# Patient Record
Sex: FEMALE | Race: WHITE | Employment: FULL TIME | RURAL
[De-identification: names, ages, dates, MRNs, and addresses within clinical notes are randomized per-mention and may not be internally consistent; named-entity substitution may affect disease eponyms.]

---

## 2022-08-13 ENCOUNTER — APPOINTMENT (OUTPATIENT)
Dept: GENERAL RADIOLOGY | Age: 42
End: 2022-08-13
Attending: EMERGENCY MEDICINE
Payer: COMMERCIAL

## 2022-08-13 ENCOUNTER — APPOINTMENT (OUTPATIENT)
Dept: CT IMAGING | Age: 42
End: 2022-08-13
Attending: EMERGENCY MEDICINE
Payer: COMMERCIAL

## 2022-08-13 ENCOUNTER — HOSPITAL ENCOUNTER (EMERGENCY)
Age: 42
Discharge: HOME | End: 2022-08-13
Attending: EMERGENCY MEDICINE
Payer: COMMERCIAL

## 2022-08-13 VITALS
BODY MASS INDEX: 23.92 KG/M2 | HEIGHT: 63 IN | WEIGHT: 135 LBS | DIASTOLIC BLOOD PRESSURE: 59 MMHG | OXYGEN SATURATION: 99 % | RESPIRATION RATE: 18 BRPM | SYSTOLIC BLOOD PRESSURE: 104 MMHG | TEMPERATURE: 98 F | HEART RATE: 72 BPM

## 2022-08-13 DIAGNOSIS — S22.41XA CLOSED FRACTURE OF MULTIPLE RIBS OF RIGHT SIDE, INITIAL ENCOUNTER: Primary | ICD-10-CM

## 2022-08-13 PROCEDURE — 77030027138 HC INCENT SPIROMETER -A

## 2022-08-13 PROCEDURE — 74011000250 HC RX REV CODE- 250: Performed by: EMERGENCY MEDICINE

## 2022-08-13 PROCEDURE — 74011250637 HC RX REV CODE- 250/637: Performed by: EMERGENCY MEDICINE

## 2022-08-13 PROCEDURE — 71250 CT THORAX DX C-: CPT

## 2022-08-13 PROCEDURE — 71101 X-RAY EXAM UNILAT RIBS/CHEST: CPT

## 2022-08-13 PROCEDURE — 99284 EMERGENCY DEPT VISIT MOD MDM: CPT

## 2022-08-13 RX ORDER — IBUPROFEN 600 MG/1
600 TABLET ORAL
Qty: 20 TABLET | Refills: 0 | Status: SHIPPED | OUTPATIENT
Start: 2022-08-13

## 2022-08-13 RX ORDER — HYDROCODONE BITARTRATE AND ACETAMINOPHEN 5; 325 MG/1; MG/1
1 TABLET ORAL
Qty: 10 TABLET | Refills: 0 | Status: SHIPPED | OUTPATIENT
Start: 2022-08-13 | End: 2022-08-16

## 2022-08-13 RX ORDER — HYDROCODONE BITARTRATE AND ACETAMINOPHEN 5; 325 MG/1; MG/1
1 TABLET ORAL
Status: COMPLETED | OUTPATIENT
Start: 2022-08-13 | End: 2022-08-13

## 2022-08-13 RX ORDER — ACETAMINOPHEN 500 MG
1000 TABLET ORAL
Status: COMPLETED | OUTPATIENT
Start: 2022-08-13 | End: 2022-08-13

## 2022-08-13 RX ORDER — LIDOCAINE 4 G/100G
1 PATCH TOPICAL
Status: DISCONTINUED | OUTPATIENT
Start: 2022-08-13 | End: 2022-08-13 | Stop reason: HOSPADM

## 2022-08-13 RX ADMIN — ACETAMINOPHEN 1000 MG: 500 TABLET ORAL at 09:30

## 2022-08-13 RX ADMIN — HYDROCODONE BITARTRATE AND ACETAMINOPHEN 1 TABLET: 5; 325 TABLET ORAL at 11:05

## 2022-08-13 NOTE — DISCHARGE INSTRUCTIONS
You were seen in the emergency room for pain after a fall. You have broken the back of rib #8 and 9. Use the pain medication every 6 hours with a dose of extra strength Tylenol (500mg) and Motrin every 6 hours so that you can take nice deep breaths and do not develop any pneumonia; is best if you alternate these medications. If you develop any trouble breathing or if worsening pain, please return to the emergency room.

## 2022-08-13 NOTE — ED TRIAGE NOTES
Pt presents via EMS for right sided rib pain s/p a GLF last night. Pt states she slipped on something and it made her fall. Pt denies hitting her head.  Pt was given fentanyl 100 mcg and 4 mg of zofran by EMS PTA

## 2022-08-13 NOTE — ED PROVIDER NOTES
EMERGENCY DEPARTMENT HISTORY AND PHYSICAL EXAM          Date: 8/13/2022  Patient Name: Lexi Cano    History of Presenting Illness     Chief Complaint   Patient presents with    Rib Pain       History Provided By: Patient and EMS    HPI: Lexi Cano is a 36 y.o. female, without previous medical history, not on medications who presents via ambulance to the ED c/o right-sided rib pain    Patient explains she was in the camper [place] when she had a trip and fall last night landing onto her counter hitting the right side of her ribs. She states she has been in extreme pain since. When she woke up this morning she was crying with 10/10 pain and it took her 45 minutes to be able to sit up and take some Aleve so they decided to call the rescue squad for transport to the hospital.  On arrival they noted patient's vital signs were normal including her oxygen at 98%. They gave her 100 mics of fentanyl which she tolerated well and patient states she is currently having 5/10 pain but does note she feels a little lightheaded so she does not want further narcotics. Patient specifically denies any recent fevers, chills, nausea, vomiting, diarrhea, abd pain, CP, SOB, urinary sxs, changes in BM, or headache. Dates she did not have any palpitations or dizziness prior to the fall last night. PCP: Senait, Not On File, FNP    Allergies: Penicillin  Social Hx: Former tobacco, -vaping, +EtOH, -Illicit Drugs    There are no other complaints, changes, or physical findings at this time. Past History     Past Medical History:  Past Medical History:   Diagnosis Date    Anxiety        Past Surgical History:  History reviewed. No pertinent surgical history. Family History:  History reviewed. No pertinent family history. Social History:  Social History     Tobacco Use    Smoking status: Never    Smokeless tobacco: Never   Substance Use Topics    Alcohol use:  Yes     Alcohol/week: 3.0 standard drinks     Types: 3 Glasses of wine per week     Comment: 3 glasses of wine daily    Drug use: Not Currently       Allergies: Allergies   Allergen Reactions    Penicillins Rash         Review of Systems   Review of Systems   Constitutional:  Negative for activity change, appetite change, chills, fever and unexpected weight change. HENT:  Negative for congestion. Eyes:  Negative for pain and visual disturbance. Respiratory:  Negative for cough and shortness of breath. Cardiovascular:  Positive for chest pain. Gastrointestinal:  Negative for abdominal pain, diarrhea, nausea and vomiting. Genitourinary:  Negative for dysuria. Musculoskeletal:  Negative for back pain. Skin:  Negative for rash. Neurological:  Negative for headaches. Physical Exam   Physical Exam  Vitals and nursing note reviewed. Constitutional:       Appearance: She is well-developed. She is not diaphoretic. Comments: Healthy-appearing middle-aged female with normal vital signs, in minimal acute distress   HENT:      Head: Normocephalic and atraumatic. Eyes:      General:         Right eye: No discharge. Left eye: No discharge. Conjunctiva/sclera: Conjunctivae normal.      Pupils: Pupils are equal, round, and reactive to light. Cardiovascular:      Rate and Rhythm: Normal rate and regular rhythm. Heart sounds: Normal heart sounds. No murmur heard. Pulmonary:      Effort: Pulmonary effort is normal. No respiratory distress. Breath sounds: Normal breath sounds. No wheezing or rales. Comments: Patient laying on her right flank guarding right ribs with a pillow. Does not tolerate exam  Chest:      Chest wall: Tenderness present. Abdominal:      General: Bowel sounds are normal. There is no distension. Palpations: Abdomen is soft. Tenderness: There is no abdominal tenderness. Comments: There is no CVA tenderness. Musculoskeletal:         General: Normal range of motion.       Cervical back: Normal range of motion and neck supple. Comments: There is no central C, T or L-spine tenderness. Skin:     General: Skin is warm and dry. Findings: No rash. Neurological:      Mental Status: She is alert and oriented to person, place, and time. Cranial Nerves: No cranial nerve deficit. Motor: No abnormal muscle tone. Diagnostic Study Results     Labs -   No results found for this or any previous visit (from the past 12 hour(s)). Radiologic Studies -   CT CHEST WO CONT   Final Result   Acute posterior fractures right ribs 8, 9. No pneumothorax. .            XR RIBS RT W PA CXR MIN 3 V   Final Result   Negative. CT Results  (Last 48 hours)                 08/13/22 1008  CT CHEST WO CONT Final result    Impression:  Acute posterior fractures right ribs 8, 9. No pneumothorax. .               Narrative:  Clinical indication: Fall, Right side chest pain. Axial CT scan of the chest obtained without intravenous contrast with coronal   and sagittal reconstructions. No prior. CT dose reduction was achieved through   the use of a standardized protocol tailored for this examination and automatic   exposure control for dose modulation . Fracture posterior aspect of the eighth and ninth rib on the right without   pneumothorax. No masses adenopathy no pericardial pleural effusion the heart size is normal.   Next                 CXR Results  (Last 48 hours)      None              Medical Decision Making   I am the first provider for this patient. I reviewed the vital signs, available nursing notes, past medical history, past surgical history, family history and social history. Vital Signs-Reviewed the patient's vital signs.   Patient Vitals for the past 12 hrs:   Temp Pulse Resp BP SpO2   08/13/22 1005 -- 72 18 (!) 104/59 99 %   08/13/22 0920 98 °F (36.7 °C) 78 18 (!) 101/57 95 %       Pulse Oximetry Analysis - 99% on RA    Records Reviewed: Nursing Notes    Provider Notes (Medical Decision Making):   MDM: Middle aged female rpesenting after trip and fall last night with right sided pain. Spinal exam normal without CVAT. Plain film imaging to be provided and will hold further narcotics until needed for pain control    ED Course:   Initial assessment performed. The patients presenting problems have been discussed, and they are in agreement with the care plan formulated and outlined with them. I have encouraged them to ask questions as they arise throughout their visit. ED Course as of 08/13/22 1458   Sat Aug 13, 2022   [id number] Patient reevaluated and appears comfortable. Updated regarding results. Explained chest x-ray was negative; options are discharge home with pain management or further imaging. Patient would like a CT to make sure she does not have any broken bones. [JT]      ED Course User Index  [JT] Torito Alonzo MD        Discharge note:  10AM  Pt re-evaluated and noted to be feeling better, ready for discharge. Updated pt on all final imaging findings. Will follow up as instructed. All questions have been answered, pt voiced understanding and agreement with plan. Specific return precautions provided as well as instructions to return to the ED should sx worsen at any time. Vital signs stable for discharge. Critical Care Time:   0      Diagnosis     Clinical Impression:   1. Closed fracture of multiple ribs of right side, initial encounter        PLAN:  1. Discharge Medication List as of 8/13/2022 11:10 AM        START taking these medications    Details   HYDROcodone-acetaminophen (Norco) 5-325 mg per tablet Take 1 Tablet by mouth every six (6) hours as needed for Pain for up to 3 days. Max Daily Amount: 4 Tablets., Normal, Disp-10 Tablet, R-0      ibuprofen (MOTRIN) 600 mg tablet Take 1 Tablet by mouth every six (6) hours as needed for Pain., Normal, Disp-20 Tablet, R-0           2.    Follow-up Information       Follow up With Specialties Details Why Contact Info    [facility] EMERGENCY DEP Emergency Medicine  If symptoms worsen [address] [phone]          Return to ED if worse     Disposition:  Home       Please note, this dictation was completed with [employer], the computer voice recognition software. Quite often unanticipated grammatical, syntax, homophones, and other interpretive errors are inadvertently transcribed by the computer software. Please disregard these errors. Please excuse any errors that have escaped final proof reading.

## 2023-05-07 ENCOUNTER — OFFICE (OUTPATIENT)
Dept: URBAN - METROPOLITAN AREA CLINIC 12 | Facility: CLINIC | Age: 43
Setting detail: OPHTHALMOLOGY
End: 2023-05-07
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
## 2023-05-07 PROCEDURE — NO SHOW FE NO SHOW FEE: Performed by: OPHTHALMOLOGY
